# Patient Record
Sex: MALE | Race: WHITE | NOT HISPANIC OR LATINO | Employment: OTHER | URBAN - METROPOLITAN AREA
[De-identification: names, ages, dates, MRNs, and addresses within clinical notes are randomized per-mention and may not be internally consistent; named-entity substitution may affect disease eponyms.]

---

## 2020-06-26 ENCOUNTER — TELEPHONE (OUTPATIENT)
Dept: UROLOGY | Facility: MEDICAL CENTER | Age: 80
End: 2020-06-26

## 2020-12-23 ENCOUNTER — OFFICE VISIT (OUTPATIENT)
Dept: URGENT CARE | Age: 80
End: 2020-12-23
Payer: MEDICARE

## 2020-12-23 VITALS
RESPIRATION RATE: 18 BRPM | TEMPERATURE: 99 F | BODY MASS INDEX: 34.07 KG/M2 | OXYGEN SATURATION: 100 % | WEIGHT: 230 LBS | HEIGHT: 69 IN | HEART RATE: 74 BPM

## 2020-12-23 DIAGNOSIS — R05.9 COUGH: Primary | ICD-10-CM

## 2020-12-23 PROCEDURE — G0463 HOSPITAL OUTPT CLINIC VISIT: HCPCS | Performed by: PHYSICIAN ASSISTANT

## 2020-12-23 PROCEDURE — 99213 OFFICE O/P EST LOW 20 MIN: CPT | Performed by: PHYSICIAN ASSISTANT

## 2020-12-23 PROCEDURE — U0003 INFECTIOUS AGENT DETECTION BY NUCLEIC ACID (DNA OR RNA); SEVERE ACUTE RESPIRATORY SYNDROME CORONAVIRUS 2 (SARS-COV-2) (CORONAVIRUS DISEASE [COVID-19]), AMPLIFIED PROBE TECHNIQUE, MAKING USE OF HIGH THROUGHPUT TECHNOLOGIES AS DESCRIBED BY CMS-2020-01-R: HCPCS | Performed by: PHYSICIAN ASSISTANT

## 2020-12-23 RX ORDER — ATORVASTATIN CALCIUM 80 MG/1
TABLET, FILM COATED ORAL
COMMUNITY
Start: 2020-10-09

## 2020-12-23 RX ORDER — AMLODIPINE BESYLATE 5 MG/1
10 TABLET ORAL DAILY
COMMUNITY
Start: 2020-03-18 | End: 2021-03-18

## 2020-12-24 LAB — SARS-COV-2 RNA SPEC QL NAA+PROBE: DETECTED

## 2020-12-26 ENCOUNTER — TELEPHONE (OUTPATIENT)
Dept: URGENT CARE | Age: 80
End: 2020-12-26

## 2021-02-12 DIAGNOSIS — Z23 ENCOUNTER FOR IMMUNIZATION: ICD-10-CM

## 2022-12-20 ENCOUNTER — OFFICE VISIT (OUTPATIENT)
Dept: URGENT CARE | Age: 82
End: 2022-12-20

## 2022-12-20 VITALS
RESPIRATION RATE: 22 BRPM | BODY MASS INDEX: 44.73 KG/M2 | HEART RATE: 111 BPM | OXYGEN SATURATION: 98 % | HEIGHT: 67 IN | WEIGHT: 285 LBS | TEMPERATURE: 97 F

## 2022-12-20 DIAGNOSIS — R05.1 ACUTE COUGH: Primary | ICD-10-CM

## 2022-12-20 LAB
SARS-COV-2 AG UPPER RESP QL IA: NEGATIVE
VALID CONTROL: NORMAL

## 2022-12-20 RX ORDER — FLUTICASONE PROPIONATE 50 MCG
1 SPRAY, SUSPENSION (ML) NASAL 2 TIMES DAILY
Qty: 11.1 ML | Refills: 0 | Status: SHIPPED | OUTPATIENT
Start: 2022-12-20

## 2022-12-20 NOTE — PROGRESS NOTES
[address]        NAME: Kely Forrest is a 78 y o  male  : 1940    MRN: [id number]  DATE: 2022  TIME: 3:20 PM    Assessment and Orders   Acute cough [R05 1]  1  Acute cough  Poct Covid 19 Rapid Antigen Test    fluticasone (FLONASE) 50 mcg/act nasal spray            Plan and Discussion      Symptoms and exam consistent with acute cough with viral etiology  Will treat symptoms with Flonase  Rapid COVID was negative  Advised against over-the-counter decongestants given patient's significant cardiac history (4 stents) and high blood pressure  Risks and benefits discussed  Patient understands and agrees with the plan  Follow up with PCP  Chief Complaint     Chief Complaint   Patient presents with   • Cold Like Symptoms     Symptoms started a "couple of days ago" with coughing a lot, feverish, sore throat, a little congestion, short of breath         History of Present Illness       He wants to make sure he doesn't have COVID  Cough  This is a new problem  The current episode started in the past 7 days ()  The problem has been unchanged  Associated symptoms include a fever (subjective), myalgias, nasal congestion, a sore throat and shortness of breath  Pertinent negatives include no chest pain or headaches  Review of Systems   Review of Systems   Constitutional: Positive for fever (subjective)  HENT: Positive for sore throat  Respiratory: Positive for cough and shortness of breath  Cardiovascular: Negative for chest pain  Musculoskeletal: Positive for myalgias  Neurological: Negative for headaches           Current Medications       Current Outpatient Medications:   •  atorvastatin (LIPITOR) 80 mg tablet, Take 1 tablet by mouth nightly, Disp: , Rfl:   •  fluticasone (FLONASE) 50 mcg/act nasal spray, 1 spray into each nostril 2 (two) times a day, Disp: 11 1 mL, Rfl: 0  •  amLODIPine (NORVASC) 5 mg tablet, Take 10 mg by mouth daily, Disp: , Rfl: Current Allergies     Allergies as of 12/20/2022 - Reviewed 12/20/2022   Allergen Reaction Noted   • Amoxicillin Other (See Comments) and Rash 09/04/2007   • Clarithromycin Other (See Comments) and Rash 09/04/2007            The following portions of the patient's history were reviewed and updated as appropriate: allergies, current medications, past family history, past medical history, past social history, past surgical history and problem list      History reviewed  No pertinent past medical history  History reviewed  No pertinent surgical history  History reviewed  No pertinent family history  Medications have been verified  Objective   Pulse (!) 111   Temp (!) 97 °F (36 1 °C)   Resp 22   Ht 5' 7" (1 702 m)   Wt 129 kg (285 lb)   SpO2 98%   BMI 44 64 kg/m²   No LMP for male patient  Physical Exam     Physical Exam  Constitutional:       General: He is not in acute distress  Appearance: He is not ill-appearing or toxic-appearing  HENT:      Head: Normocephalic and atraumatic  Right Ear: Tympanic membrane and external ear normal       Left Ear: Tympanic membrane and external ear normal       Nose: Congestion present  Comments: Boggy nasal turbinates     Mouth/Throat:      Pharynx: Posterior oropharyngeal erythema present  Comments: Cobblestone appearance in posterior pharynx  Cardiovascular:      Rate and Rhythm: Regular rhythm  Tachycardia present  Pulmonary:      Effort: Pulmonary effort is normal  No respiratory distress  Breath sounds: No wheezing, rhonchi or rales  Neurological:      General: No focal deficit present  Mental Status: He is alert and oriented to person, place, and time  Psychiatric:         Mood and Affect: Mood normal          Behavior: Behavior normal          Thought Content:  Thought content normal          Judgment: Judgment normal                Ilya Siddiqi DO

## 2025-03-27 ENCOUNTER — HOME HEALTH ADMISSION (OUTPATIENT)
Dept: HOME HEALTH SERVICES | Facility: HOME HEALTHCARE | Age: 85
End: 2025-03-27
Payer: MEDICARE

## 2025-03-28 ENCOUNTER — HOME CARE VISIT (OUTPATIENT)
Dept: HOME HEALTH SERVICES | Facility: HOME HEALTHCARE | Age: 85
End: 2025-03-28

## 2025-03-31 ENCOUNTER — HOME CARE VISIT (OUTPATIENT)
Dept: HOME HEALTH SERVICES | Facility: HOME HEALTHCARE | Age: 85
End: 2025-03-31
Payer: MEDICARE

## 2025-03-31 VITALS
DIASTOLIC BLOOD PRESSURE: 62 MMHG | TEMPERATURE: 98 F | OXYGEN SATURATION: 95 % | HEART RATE: 60 BPM | SYSTOLIC BLOOD PRESSURE: 120 MMHG

## 2025-03-31 PROCEDURE — G0151 HHCP-SERV OF PT,EA 15 MIN: HCPCS

## 2025-03-31 PROCEDURE — 10330081 VN NO-PAY CLAIM PROCEDURE

## 2025-03-31 PROCEDURE — 400013 VN SOC

## 2025-04-02 ENCOUNTER — HOME CARE VISIT (OUTPATIENT)
Dept: HOME HEALTH SERVICES | Facility: HOME HEALTHCARE | Age: 85
End: 2025-04-02
Payer: MEDICARE

## 2025-04-02 PROCEDURE — G0321 AUDIO-ONLY HHS: HCPCS

## 2025-04-02 NOTE — CASE COMMUNICATION
"Add Dr. Chavez Rangel as patient's PCP  Fax to Dr. Venessa Huerta (neurologist) and Dr. Chavez Rangel (PCP)    Medication discrepancies or Major drug interactions: n/a  Abnormal clinical findings: dementia, poor carryover  This report is informational only, no response is needed  [facility] has Admitted your patient to Home Health service with the following disciplines: PT, OT, ST  Patient stated goals of care: \"I want my dad to be sa scott.\"  Potential barriers to goal achievement: dementia, lacks safety awareness  Primary focus of home health care:Neurological  Anticipated visit pattern 2w4 and next visit date: 4/4/25  Thank you for allowing us to participate in the care of your patient.        "

## 2025-04-02 NOTE — CASE COMMUNICATION
Myron Mendiola,    For the medication review, I was able to use a file from the patient's daughter. I can't remember her name eden her phone number is [phone]. We were able to use share everywhere on epic through a link. There is a limit to the timeframe you can access his record. Unfortunately, none of his medical history and records have transferred over to remote client. I'm not sure if you will be able to see more than me.    I cross  referenced what is on the list with what is in the home and added the additional supplements that were not included.

## 2025-04-03 ENCOUNTER — HOME CARE VISIT (OUTPATIENT)
Dept: HOME HEALTH SERVICES | Facility: HOME HEALTHCARE | Age: 85
End: 2025-04-03
Payer: MEDICARE

## 2025-04-03 VITALS
DIASTOLIC BLOOD PRESSURE: 77 MMHG | SYSTOLIC BLOOD PRESSURE: 144 MMHG | OXYGEN SATURATION: 91 % | HEART RATE: 58 BPM | TEMPERATURE: 98.1 F

## 2025-04-03 PROCEDURE — G0153 HHCP-SVS OF S/L PATH,EA 15MN: HCPCS

## 2025-04-04 ENCOUNTER — HOME CARE VISIT (OUTPATIENT)
Dept: HOME HEALTH SERVICES | Facility: HOME HEALTHCARE | Age: 85
End: 2025-04-04
Payer: MEDICARE

## 2025-04-04 VITALS
OXYGEN SATURATION: 97 % | HEART RATE: 63 BPM | DIASTOLIC BLOOD PRESSURE: 60 MMHG | TEMPERATURE: 98.2 F | SYSTOLIC BLOOD PRESSURE: 132 MMHG

## 2025-04-04 PROCEDURE — G0152 HHCP-SERV OF OT,EA 15 MIN: HCPCS

## 2025-04-04 PROCEDURE — G0151 HHCP-SERV OF PT,EA 15 MIN: HCPCS

## 2025-04-05 VITALS — SYSTOLIC BLOOD PRESSURE: 138 MMHG | OXYGEN SATURATION: 95 % | HEART RATE: 71 BPM | DIASTOLIC BLOOD PRESSURE: 66 MMHG

## 2025-04-07 ENCOUNTER — HOME CARE VISIT (OUTPATIENT)
Dept: HOME HEALTH SERVICES | Facility: HOME HEALTHCARE | Age: 85
End: 2025-04-07
Payer: MEDICARE

## 2025-04-07 VITALS
OXYGEN SATURATION: 96 % | HEART RATE: 86 BPM | DIASTOLIC BLOOD PRESSURE: 54 MMHG | SYSTOLIC BLOOD PRESSURE: 118 MMHG | TEMPERATURE: 98.1 F

## 2025-04-07 PROCEDURE — G0151 HHCP-SERV OF PT,EA 15 MIN: HCPCS

## 2025-04-07 NOTE — CASE COMMUNICATION
This message is informative only.  No action required.     HH  Josiane made 4.3.25.      Impression. Observed cognitive linguistic deficits in the following areas:  Mild,Moderate in the areas of immediate memory,and  problem solving,  Moderate in STM, Moderate.Severe in organization    Difficulty with working memory to use his walker, charge his phone,  when to take pills, and complete his chores.  Mild.moderate writing deficit when Pt.  signs his name.     Oriented x3.    Observed speech and language to be WFL in conversational speech.  He reportedly is tolerating a regular diet and thin liquids.  Hearing.  [place]     Vision.  Pt. reportedly can read newspaper with glasses.     Rec.   Sp tx 1x1 wk, 2x 3 wks.  Cont informal eval  Practice assignments given  including memory card games   Make list of functional tasks Pt. has difficulty remembering

## 2025-04-08 ENCOUNTER — HOME CARE VISIT (OUTPATIENT)
Dept: HOME HEALTH SERVICES | Facility: HOME HEALTHCARE | Age: 85
End: 2025-04-08
Payer: MEDICARE

## 2025-04-08 VITALS — HEART RATE: 55 BPM | DIASTOLIC BLOOD PRESSURE: 52 MMHG | SYSTOLIC BLOOD PRESSURE: 135 MMHG

## 2025-04-08 PROCEDURE — G0153 HHCP-SVS OF S/L PATH,EA 15MN: HCPCS

## 2025-04-09 ENCOUNTER — HOME CARE VISIT (OUTPATIENT)
Dept: HOME HEALTH SERVICES | Facility: HOME HEALTHCARE | Age: 85
End: 2025-04-09
Payer: MEDICARE

## 2025-04-09 VITALS — OXYGEN SATURATION: 95 % | HEART RATE: 69 BPM | SYSTOLIC BLOOD PRESSURE: 144 MMHG | DIASTOLIC BLOOD PRESSURE: 78 MMHG

## 2025-04-09 PROCEDURE — G0158 HHC OT ASSISTANT EA 15: HCPCS

## 2025-04-10 ENCOUNTER — HOME CARE VISIT (OUTPATIENT)
Dept: HOME HEALTH SERVICES | Facility: HOME HEALTHCARE | Age: 85
End: 2025-04-10
Payer: MEDICARE

## 2025-04-10 VITALS — HEART RATE: 80 BPM | SYSTOLIC BLOOD PRESSURE: 138 MMHG | DIASTOLIC BLOOD PRESSURE: 60 MMHG

## 2025-04-10 PROCEDURE — G0157 HHC PT ASSISTANT EA 15: HCPCS

## 2025-04-10 PROCEDURE — G0153 HHCP-SVS OF S/L PATH,EA 15MN: HCPCS

## 2025-04-11 ENCOUNTER — HOME CARE VISIT (OUTPATIENT)
Dept: HOME HEALTH SERVICES | Facility: HOME HEALTHCARE | Age: 85
End: 2025-04-11
Payer: MEDICARE

## 2025-04-11 VITALS — HEART RATE: 66 BPM | SYSTOLIC BLOOD PRESSURE: 152 MMHG | DIASTOLIC BLOOD PRESSURE: 80 MMHG | OXYGEN SATURATION: 96 %

## 2025-04-11 PROCEDURE — G0158 HHC OT ASSISTANT EA 15: HCPCS

## 2025-04-14 ENCOUNTER — HOME CARE VISIT (OUTPATIENT)
Dept: HOME HEALTH SERVICES | Facility: HOME HEALTHCARE | Age: 85
End: 2025-04-14
Payer: MEDICARE

## 2025-04-14 VITALS — DIASTOLIC BLOOD PRESSURE: 70 MMHG | SYSTOLIC BLOOD PRESSURE: 132 MMHG | OXYGEN SATURATION: 94 % | HEART RATE: 64 BPM

## 2025-04-14 PROCEDURE — G0158 HHC OT ASSISTANT EA 15: HCPCS

## 2025-04-15 ENCOUNTER — HOME CARE VISIT (OUTPATIENT)
Dept: HOME HEALTH SERVICES | Facility: HOME HEALTHCARE | Age: 85
End: 2025-04-15
Payer: MEDICARE

## 2025-04-15 VITALS — HEART RATE: 75 BPM | DIASTOLIC BLOOD PRESSURE: 60 MMHG | OXYGEN SATURATION: 96 % | SYSTOLIC BLOOD PRESSURE: 128 MMHG

## 2025-04-15 PROCEDURE — G0157 HHC PT ASSISTANT EA 15: HCPCS

## 2025-04-15 PROCEDURE — G0153 HHCP-SVS OF S/L PATH,EA 15MN: HCPCS

## 2025-04-16 ENCOUNTER — HOME CARE VISIT (OUTPATIENT)
Dept: HOME HEALTH SERVICES | Facility: HOME HEALTHCARE | Age: 85
End: 2025-04-16
Payer: MEDICARE

## 2025-04-16 VITALS — SYSTOLIC BLOOD PRESSURE: 126 MMHG | OXYGEN SATURATION: 97 % | DIASTOLIC BLOOD PRESSURE: 59 MMHG | HEART RATE: 60 BPM

## 2025-04-16 PROCEDURE — G0153 HHCP-SVS OF S/L PATH,EA 15MN: HCPCS

## 2025-04-17 ENCOUNTER — HOME CARE VISIT (OUTPATIENT)
Dept: HOME HEALTH SERVICES | Facility: HOME HEALTHCARE | Age: 85
End: 2025-04-17
Payer: MEDICARE

## 2025-04-17 VITALS — OXYGEN SATURATION: 94 % | DIASTOLIC BLOOD PRESSURE: 60 MMHG | HEART RATE: 57 BPM | SYSTOLIC BLOOD PRESSURE: 138 MMHG

## 2025-04-17 PROCEDURE — G0157 HHC PT ASSISTANT EA 15: HCPCS

## 2025-04-18 ENCOUNTER — HOME CARE VISIT (OUTPATIENT)
Dept: HOME HEALTH SERVICES | Facility: HOME HEALTHCARE | Age: 85
End: 2025-04-18
Payer: MEDICARE

## 2025-04-18 VITALS — SYSTOLIC BLOOD PRESSURE: 142 MMHG | OXYGEN SATURATION: 95 % | DIASTOLIC BLOOD PRESSURE: 64 MMHG | HEART RATE: 67 BPM

## 2025-04-18 PROCEDURE — G0158 HHC OT ASSISTANT EA 15: HCPCS

## 2025-04-21 ENCOUNTER — HOME CARE VISIT (OUTPATIENT)
Dept: HOME HEALTH SERVICES | Facility: HOME HEALTHCARE | Age: 85
End: 2025-04-21
Payer: MEDICARE

## 2025-04-21 VITALS
HEART RATE: 64 BPM | TEMPERATURE: 98 F | SYSTOLIC BLOOD PRESSURE: 132 MMHG | OXYGEN SATURATION: 99 % | DIASTOLIC BLOOD PRESSURE: 62 MMHG

## 2025-04-21 PROCEDURE — G0151 HHCP-SERV OF PT,EA 15 MIN: HCPCS

## 2025-04-22 ENCOUNTER — HOME CARE VISIT (OUTPATIENT)
Dept: HOME HEALTH SERVICES | Facility: HOME HEALTHCARE | Age: 85
End: 2025-04-22
Payer: MEDICARE

## 2025-04-22 VITALS
HEART RATE: 59 BPM | TEMPERATURE: 97.7 F | SYSTOLIC BLOOD PRESSURE: 126 MMHG | OXYGEN SATURATION: 97 % | DIASTOLIC BLOOD PRESSURE: 58 MMHG

## 2025-04-22 PROCEDURE — G0153 HHCP-SVS OF S/L PATH,EA 15MN: HCPCS

## 2025-04-23 ENCOUNTER — HOME CARE VISIT (OUTPATIENT)
Dept: HOME HEALTH SERVICES | Facility: HOME HEALTHCARE | Age: 85
End: 2025-04-23
Payer: MEDICARE

## 2025-04-23 PROCEDURE — G0153 HHCP-SVS OF S/L PATH,EA 15MN: HCPCS

## 2025-04-24 ENCOUNTER — HOME CARE VISIT (OUTPATIENT)
Dept: HOME HEALTH SERVICES | Facility: HOME HEALTHCARE | Age: 85
End: 2025-04-24
Payer: MEDICARE

## 2025-04-24 VITALS — OXYGEN SATURATION: 97 % | SYSTOLIC BLOOD PRESSURE: 148 MMHG | HEART RATE: 63 BPM | DIASTOLIC BLOOD PRESSURE: 68 MMHG

## 2025-04-24 VITALS
SYSTOLIC BLOOD PRESSURE: 128 MMHG | DIASTOLIC BLOOD PRESSURE: 54 MMHG | HEART RATE: 69 BPM | TEMPERATURE: 98 F | OXYGEN SATURATION: 96 %

## 2025-04-24 PROCEDURE — G0152 HHCP-SERV OF OT,EA 15 MIN: HCPCS

## 2025-04-24 PROCEDURE — G0151 HHCP-SERV OF PT,EA 15 MIN: HCPCS

## 2025-04-25 NOTE — CASE COMMUNICATION
This message is informative only.  No action required.     Pt DCed from Sp tx 4.23.25 due to no longer being homebound.    Impression  Observed cognitive linguistic deficits in the following areas:    Progress made but continue to be Mild,Moderate in the areas of immediate memory and STM.   Very mild which is an increase from mild.moderate in problem solving,    Moderate which is an increase from , Moderate.Severe in organization      O riented x3.   Pt achieved 7.10 working memory on daily living activities which is an increase from  Difficulty with working memory to use his walker, charge his phone when to take pills, and complete his chores   Mild which is an increase from Mild.moderate writing deficit when Pt. signs his name.    Observed speech and language to be WFL in conversational speech. He reportedly is tolerating a regular diet and thin liquids.   Hearing. [place]     Vision. Pt. reportedly can read newspaper with glasses.     Rec.   DC from sp tx. due to no longer homebound  Cont Out Pt Sp tx as desired.   Practice assignments given including memory card games and puzzles  Refer if change in status

## 2025-04-25 NOTE — CASE COMMUNICATION
This message is informative only.  No action required.     Pt DCed from  ST due to reaching maximum potential.

## 2025-08-15 ENCOUNTER — HOSPITAL ENCOUNTER (EMERGENCY)
Facility: HOSPITAL | Age: 85
Discharge: HOME | End: 2025-08-15
Attending: EMERGENCY MEDICINE
Payer: MEDICARE

## 2025-08-15 ENCOUNTER — APPOINTMENT (EMERGENCY)
Dept: RADIOLOGY | Facility: HOSPITAL | Age: 85
End: 2025-08-15
Payer: MEDICARE